# Patient Record
Sex: MALE | Race: WHITE | NOT HISPANIC OR LATINO | Employment: FULL TIME | URBAN - METROPOLITAN AREA
[De-identification: names, ages, dates, MRNs, and addresses within clinical notes are randomized per-mention and may not be internally consistent; named-entity substitution may affect disease eponyms.]

---
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
## 2025-07-14 ENCOUNTER — HOSPITAL ENCOUNTER (EMERGENCY)
Facility: MEDICAL CENTER | Age: 20
End: 2025-07-14
Attending: STUDENT IN AN ORGANIZED HEALTH CARE EDUCATION/TRAINING PROGRAM
Payer: COMMERCIAL

## 2025-07-14 VITALS
HEIGHT: 75 IN | BODY MASS INDEX: 20.39 KG/M2 | OXYGEN SATURATION: 96 % | DIASTOLIC BLOOD PRESSURE: 80 MMHG | TEMPERATURE: 99.2 F | RESPIRATION RATE: 18 BRPM | SYSTOLIC BLOOD PRESSURE: 137 MMHG | WEIGHT: 164.02 LBS | HEART RATE: 84 BPM

## 2025-07-14 DIAGNOSIS — Z77.098 CHEMICAL EXPOSURE OF EYE: ICD-10-CM

## 2025-07-14 DIAGNOSIS — S05.01XA ABRASION OF RIGHT CORNEA, INITIAL ENCOUNTER: Primary | ICD-10-CM

## 2025-07-14 PROCEDURE — 700101 HCHG RX REV CODE 250: Performed by: STUDENT IN AN ORGANIZED HEALTH CARE EDUCATION/TRAINING PROGRAM

## 2025-07-14 PROCEDURE — 99283 EMERGENCY DEPT VISIT LOW MDM: CPT

## 2025-07-14 PROCEDURE — 700105 HCHG RX REV CODE 258: Performed by: STUDENT IN AN ORGANIZED HEALTH CARE EDUCATION/TRAINING PROGRAM

## 2025-07-14 RX ORDER — CARBOXYMETHYLCELLULOSE SODIUM 10 MG/ML
1 SOLUTION/ DROPS OPHTHALMIC PRN
Qty: 15 ML | Refills: 0 | Status: SHIPPED | OUTPATIENT
Start: 2025-07-14 | End: 2025-07-19

## 2025-07-14 RX ORDER — SODIUM CHLORIDE 9 MG/ML
2000 INJECTION, SOLUTION INTRAVENOUS ONCE
Status: COMPLETED | OUTPATIENT
Start: 2025-07-14 | End: 2025-07-14

## 2025-07-14 RX ORDER — ERYTHROMYCIN 5 MG/G
1 OINTMENT OPHTHALMIC 4 TIMES DAILY
Qty: 3.5 G | Refills: 0 | Status: ACTIVE | OUTPATIENT
Start: 2025-07-14 | End: 2025-07-19

## 2025-07-14 RX ORDER — FLUORESCEIN SODIUM 1 MG/MG
1 STRIP OPHTHALMIC ONCE
Status: COMPLETED | OUTPATIENT
Start: 2025-07-14 | End: 2025-07-14

## 2025-07-14 RX ORDER — PROPARACAINE HYDROCHLORIDE 5 MG/ML
1 SOLUTION/ DROPS OPHTHALMIC ONCE
Status: COMPLETED | OUTPATIENT
Start: 2025-07-14 | End: 2025-07-14

## 2025-07-14 RX ORDER — SODIUM CHLORIDE 9 MG/ML
1000 INJECTION, SOLUTION INTRAVENOUS ONCE
Status: DISCONTINUED | OUTPATIENT
Start: 2025-07-14 | End: 2025-07-14

## 2025-07-14 RX ADMIN — FLUORESCEIN SODIUM 1 MG: 1 STRIP OPHTHALMIC at 17:15

## 2025-07-14 RX ADMIN — PROPARACAINE HYDROCHLORIDE 1 DROP: 5 SOLUTION/ DROPS OPHTHALMIC at 17:15

## 2025-07-14 RX ADMIN — SODIUM CHLORIDE 2000 ML: 9 INJECTION, SOLUTION INTRAVENOUS at 17:45

## 2025-07-14 ASSESSMENT — FIBROSIS 4 INDEX: FIB4 SCORE: 0.38

## 2025-07-14 NOTE — ED TRIAGE NOTES
"Chief Complaint   Patient presents with    Eye Pain     Pt reports about an hour ago at [employer], his work, he was changing the soap for the rotisserie and the soap splashed into his right eye. Pt states he use the eye wash station for 5 minutes. Now reports pain to right eye and \"feels like there sand in it\" Pt reports vision looks slightly cloudy. Redness noted to right eye.      BP (!) 154/76   Pulse 91   Temp 37.3 °C (99.2 °F) (Temporal)   Resp 18   Ht 1.905 m (6' 3\")   Wt 74.4 kg (164 lb 0.4 oz)   SpO2 94%   BMI 20.50 kg/m²     "

## 2025-07-14 NOTE — LETTER
"  EMPLOYEE’S CLAIM FOR COMPENSATION/ REPORT OF INITIAL TREATMENT  FORM C-4  PLEASE TYPE OR PRINT    EMPLOYEE’S CLAIM - PROVIDE ALL INFORMATION REQUESTED   First Name                    CHASITY Castillo                  Last Name  Chris Birthdate                    2005                Sex  [x]Male Claim Number (Insurer’s Use Only)     Mailing Address  [address] Age  20 y.o. Height  1.905 m (6' 3\") Weight  74.4 kg (164 lb 0.4 oz) Social Security Number     [facility] Zip  [address] Telephone  [phone] (home)    Email  No e-mail address on record    Primary Language Spoken  English    INSURER   THIRD-PARTY   crossvertise   Employee's Occupation (Job Title) When Injury or Occupational Disease Occurred  [employer]ol[employer] Christine    Employer's Name/Company Name [employer] Telephone [phone]      Office Mail Address (Number and Street)  [address]   Date of Injury (if applicable) 7/14/2025               Hours Injury (if applicable)  3:05 PM am    pm Date Employer Notified  7/14/2025 Last Day of Work after Injury or Occupational Disease  7/14/2025 Supervisor to Whom Injury Reported  Shanelle Patterson   Address or Location of Accident (if applicable)  Work [1]   What were you doing at the time of accident? (if applicable)  Changing soap    How did this injury or occupational disease occur? (Be specific and answer in detail. Use additional sheet if necessary)  place soap not right in place,fell hit ground splashed in eye.   If you believe that you have an occupational disease, when did you first have knowledge of the disability and its relationship to your employment?   Witnesses to the Accident (if applicable)  N/A      Nature of Injury or Occupational Disease  Workers' Compensation  Part(s) of Body Injured or Affected  Eye (R) N/A N/A    I CERTIFY THAT THE ABOVE IS TRUE AND CORRECT TO T HE BEST OF MY " KNOWLEDGE AND THAT I HAVE PROVIDED THIS INFORMATION IN ORDER TO OBTAIN THE BENEFITS OF [place] OR [place]  I HEREBY AUTHORIZE ANY PHYSICIAN, CHIROPRACTOR, SURGEON, PRACTITIONER OR ANY OTHER PERSON, ANY HOSPITAL, INCLUDING [facility] OR [facility], ANY  MEDICAL SERVICE ORGANIZATION, ANY INSURANCE COMPANY, OR OTHER INSTITUTION OR ORGANIZATION TO RELEASE TO EACH OTHER, ANY MEDICAL OR OTHER INFORMATION, INCLUDING BENEFITS PAID OR PAYABLE, PERTINENT TO THIS INJURY OR DISEASE, EXCEPT INFORMATION RELATIVE TO DIAGNOSIS, TREATMENT AND/OR COUNSELING FOR AIDS, PSYCHOLOGICAL CONDITIONS, ALCOHOL OR CONTROLLED SUBSTANCES, FOR WHICH I MUST GIVE SPECIFIC AUTHORIZATION.  A PHOTOSTAT OF THIS AUTHORIZATION SHALL BE VALID AS THE ORIGINAL.     Date: 07/14/2025   Place: [facility] Employee’s Original or  *Electronic Signature   THIS REPORT MUST BE COMPLETED AND MAILED WITHIN 3 WORKING DAYS OF TREATMENT   Place  [facility], EMERGENCY DEPT    Name of Facility  [facility], EMERGENCY DEPT   Date 7/14/2025 Diagnosis and Description of Injury or Occupational Disease  (S05.01XA) Abrasion of right cornea, initial encounter  (primary encounter diagnosis)  (Z77.098) Chemical exposure of eye  The primary encounter diagnosis was Abrasion of right cornea, initial encounter. A diagnosis of Chemical exposure of eye was also pertinent to this visit. Is there evidence that the injured employee was under the influence of alcohol and/or another controlled substance at the time of accident?  [x]No  [] Yes (if yes, please explain)   Hour   No   Treatment: Irrigation, antibiotic therapy, artificial tears    Have you advised the patient to remain off work five days or more?   No  [] Yes  If yes, indicate dates: From_ _                                                      To __ _  [x] No   If no, is  the injured employee capable of: [x] full duty Yes   [] modified duty      If modified duty, specify any limitations / restrictions:__________________  ___ ___________________________     X-Ray Findings:      From information given by the employee, together with medical evidence, can you directly connect this injury or occupational disease as job incurred?  [x]Yes   [] No Yes    Is additional medical care by a physician indicated? [x]Yes [] No  Yes  Comments:Ophthalmologist    Do you know of any previous injury or disease contributing to this condition or occupational disease? []Yes [x] No (Explain if yes)                          No   Date  7/14/2025 Print Health Care Provider’s Name  DANG Palma M.D. I certify that the employer’s copy of  this form was delivered to the employer on:   Address   [id number] [place] [address] INSURER'S USE ONLY                       [facility] Zip   [address] Provider’s Tax ID Number  [id number]   Telephone  Dept: [phone]    Health Care Provider’s Original or Electronic Signature      DANG Palma M.D.    Degree (MD,DO, DC,PA-C,APRN)  MD      ORIGINAL - TREATING HEALTHCARE PROVIDER PAGE 2 - INSURER/TPA PAGE 3 - EMPLOYER PAGE 4 - EMPLOYEE             Form C-4 (rev.02/25)

## 2025-07-14 NOTE — ED NOTES
Visual acuity test performed with results of right eye 20/70 Uncorrected , left eye 20/50 Uncorrected , and both eyes 20/50 Uncorrected

## 2025-07-15 NOTE — ED PROVIDER NOTES
"ED Provider Note    CHIEF COMPLAINT  Chief Complaint   Patient presents with    Eye Pain     Pt reports about an hour ago at [employer], his work, he was changing the soap for the [employer] and the soap splashed into his right eye. Pt states he use the eye wash station for 5 minutes. Now reports pain to right eye and \"feels like there sand in it\" Pt reports vision looks slightly cloudy. Redness noted to right eye.        EXTERNAL RECORDS REVIEWED  Other noncontributory    HPI/ROS  LIMITATION TO HISTORY   Select: : None    Christine JaureguiJonathanDarren Perez is a 20 y.o. bio male who presents to the emergency department for evaluation of eye pain.  The patient reports that they work at [employer] and splashed a caustic soap that is used to disintegrate chicken skin and grime from trays onto the right side of their face and eye.  They immediately washed their eye out for 5 minutes with water which they states helped the burning pain that they were experiencing however this has persisted prompting presentation.  This occurred about 2 hours prior to arrival.  The patient reports some changes to the eyelids and skin of the upper right cheek and surrounding the eye and redness to the right eye and that it feels like a burning or grain of sand is present beneath the lids.  They report that their vision appears somewhat cloudy but report this is largely due to the tearing.  They report that they typically wear corrective glasses but do not have them.  They report vision is typically worse in this eye than the other.    PAST MEDICAL HISTORY   has a past medical history of Patient denies medical problems.    SURGICAL HISTORY   has a past surgical history that includes hernia repair and dental surgery.    FAMILY HISTORY  Family History   Problem Relation Age of Onset    Asthma Father     Heart Disease Maternal Grandmother     Diabetes Maternal Grandmother     Cancer Maternal Grandmother         liver    Asthma Paternal Grandmother     Asthma " "Paternal Grandfather     Drug abuse Paternal Grandfather        SOCIAL HISTORY  Social History     Tobacco Use    Smoking status: Never    Smokeless tobacco: Never   Vaping Use    Vaping status: Every Day   Substance and Sexual Activity    Alcohol use: Yes     Comment: occ    Drug use: Yes     Types: Inhaled     Comment: THC    Sexual activity: Never       CURRENT MEDICATIONS  Home Medications       Reviewed by Amada Conrad R.N. (Registered Nurse) on 07/14/25 at 1607  Med List Status: Partial     Medication Last Dose Status        Patient Gautam Taking any Medications                           ALLERGIES  Allergies[1]    PHYSICAL EXAM  VITAL SIGNS: BP (!) 154/76   Pulse 91   Temp 37.3 °C (99.2 °F) (Temporal)   Resp 18   Ht 1.905 m (6' 3\")   Wt 74.4 kg (164 lb 0.4 oz)   SpO2 94%   BMI 20.50 kg/m²    Constitutional: No acute distress.  HENT: Normocephalic, Atraumatic, Bilateral external ears normal. Nose normal.   Eyes: Pupils are 3 mm equal round and reactive to light.  Extraocular muscles intact.  Surrounding the right eye particularly to the inferior lid and upper cheek there is erythematous skin change that is somewhat tender to palpation.  Sclera minimally injected.  Visual acuity 20/50 on the right, 20/30 on the left.  pH of the affected eye in the 8-9 range.  Small superficial corneal abrasion to the 5 o'clock position of the right eye.  Negative Osvaldo sign.  Skin: Warm, Dry, erythematous skin changes above  Neurologic: Alert and oriented x 3  Psychiatric: Appropriate affect for situation        COURSE & MEDICAL DECISION MAKING    ASSESSMENT, COURSE AND PLAN  Care Narrative:     Patient presents the emergency department for evaluation of right eye pain after inadvertently getting splashed in the right eye with a caustic soap used at their workplace.  Visual acuity is minimally diminished on the right however patient does not have corrective glasses and also reports some asymmetric worsening on the " right at baseline so it is tough to know how far from baseline they are.  pH of affected eye somewhat alkalotic however.  Immediate irrigation with normal saline initiated.  Will continuously recheck pH.    Following 1 hour and 2 L of saline irrigation pH rechecked with pH paper and appears to have normalized.  Fluorescein stain performed demonstrating a small superficial corneal abrasion.  No evidence of open globe.  Pain has improved significantly.  Discussed with patient my plan to discharge with topical erythromycin ointment for lubrication and prophylaxis against infection and also artificial tears for comfort.  Will place referral for ophthalmology follow-up for recheck to ensure appropriate healing and no worsening/necrosis.  Strict ER return precautions discussed for any worsening pain or vision change and all questions answered.  Patient discharged in stable condition.      ADDITIONAL PROBLEMS MANAGED  None    DISPOSITION AND DISCUSSIONS  I have discussed management of the patient with the following physicians and NIKITA's: None    Discussion of management with other QHP or appropriate source(s): None     Decision tools and prescription drugs considered including, but not limited to: Antibiotics erythromycin ointment.    FINAL DIAGNOSIS  1. Abrasion of right cornea, initial encounter    2. Chemical exposure of eye         Electronically signed by: Ross Lowery M.D., 7/14/2025 5:18 PM           [1]   Allergies  Allergen Reactions    Amoxicillin Rash

## 2025-07-15 NOTE — DISCHARGE INSTRUCTIONS
Please call the attached number to schedule a follow-up appointment with the ophthalmologist to be seen in their clinic to ensure your eye is healing.    Please apply the erythromycin ointment to your right eye 4 times a day and before bed for the next 5 days.  You can also use artificial tears with 1 or 2 drops in the right eye 3-4 times a day to help lubricate.    Please return to the emergency department if you begin to experience worsening eye pain, vision change or otherwise feeling much worse.

## 2025-07-15 NOTE — ED NOTES
Discharge instructions provided. Pt verbalized understanding of discharge instructions to follow up with opthalmology and to return to ER if condition worsens. Pt ambulated out of ER without difficulty.

## 2025-07-15 NOTE — ED NOTES
Irrigation finished. Pt states eye feels better but still has some irritation along waterline. ERP to bedside to recheck pH

## 2025-07-21 NOTE — Clinical Note
REFERRAL APPROVAL NOTICE         Sent on July 21, 2025                   Jonathan Perez  [address]  [facility] [address]                   Dear Mr. Perez,    After a careful review of the medical information and benefit coverage, [facility] has processed your referral. See below for additional details.    If applicable, you must be actively enrolled with your insurance for coverage of the authorized service. If you have any questions regarding your coverage, please contact your insurance directly.    REFERRAL INFORMATION   Referral #:  [id number]  Referred-To Provider    Referred-By Provider:  Ophthalmology    Ross Lowery M.D.   [facility]      [address] [place] [address] Emergency Room  [address]  [facility] [address]  [phone] [address]  [facility] [address]  [phone]    Referral Start Date:  07/14/2025  Referral End Date:   07/14/2026             SCHEDULING  If you do not already have an appointment, please call [phone] to make an appointment.     MORE INFORMATION  If you do not already have a [email or web address] account, sign up at: "[email or web address]  You can access your medical information, make appointments, see lab results, billing information, and more.  If you have questions regarding this referral, please contact  the [facility] Referrals department at:             [phone]. Monday - Friday 8:00AM - 5:00PM.     Sincerely,    [facility]